# Patient Record
Sex: MALE | Race: WHITE | NOT HISPANIC OR LATINO | URBAN - METROPOLITAN AREA
[De-identification: names, ages, dates, MRNs, and addresses within clinical notes are randomized per-mention and may not be internally consistent; named-entity substitution may affect disease eponyms.]

---

## 2020-11-03 ENCOUNTER — OFFICE VISIT (OUTPATIENT)
Dept: URBAN - METROPOLITAN AREA CLINIC 60 | Facility: CLINIC | Age: 65
End: 2020-11-03

## 2020-11-09 ENCOUNTER — OFFICE VISIT (OUTPATIENT)
Dept: URBAN - METROPOLITAN AREA CLINIC 121 | Facility: CLINIC | Age: 65
End: 2020-11-09

## 2020-11-18 LAB
% SATURATION: (no result)
ABSOLUTE BASOPHILS: (no result)
ABSOLUTE EOSINOPHILS: (no result)
ABSOLUTE LYMPHOCYTES: (no result)
ABSOLUTE MONOCYTES: (no result)
ABSOLUTE NEUTROPHILS: (no result)
ALBUMIN/GLOBULIN RATIO: (no result)
ALBUMIN: (no result)
ALKALINE PHOSPHATASE: (no result)
ALT: (no result)
AST: (no result)
BASOPHILS: (no result)
BILIRUBIN, TOTAL: (no result)
BUN/CREATININE RATIO: (no result)
CALCIUM: (no result)
CARBON DIOXIDE: (no result)
CHLORIDE: (no result)
CREATININE: (no result)
EGFR AFRICAN AMERICA: (no result)
EGFR NON-AFR. AMERICAN: (no result)
EOSINOPHILS: (no result)
FERRITIN: (no result)
FOLATE, SERUM: (no result)
GLOBULIN: (no result)
GLUCOSE: (no result)
HEMATOCRIT: (no result)
HEMOGLOBIN: (no result)
HEPATITIS C ANTIBODY: (no result)
INR: 1
IRON BINDING CAPACITY: (no result)
IRON, TOTAL: (no result)
LYMPHOCYTES: (no result)
MCH: (no result)
MCHC: (no result)
MCV: (no result)
MONOCYTES: (no result)
MPV: (no result)
NEUTROPHILS: (no result)
PLATELET COUNT: (no result)
POTASSIUM: (no result)
PROTEIN, TOTAL: (no result)
PT: (no result)
RDW: (no result)
RED BLOOD CELL COUNT: (no result)
SIGNAL TO CUT-OFF: 0.02
SODIUM: (no result)
UREA NITROGEN (BUN): (no result)
VITAMIN B12: (no result)
WHITE BLOOD CELL COUNT: (no result)

## 2020-11-20 ENCOUNTER — OFFICE VISIT (OUTPATIENT)
Dept: URBAN - METROPOLITAN AREA SURGERY CENTER 4 | Facility: SURGERY CENTER | Age: 65
End: 2020-11-20

## 2020-11-24 ENCOUNTER — OFFICE VISIT (OUTPATIENT)
Dept: URBAN - METROPOLITAN AREA SURGERY CENTER 4 | Facility: SURGERY CENTER | Age: 65
End: 2020-11-24

## 2020-12-04 ENCOUNTER — OFFICE VISIT (OUTPATIENT)
Dept: URBAN - METROPOLITAN AREA CLINIC 63 | Facility: CLINIC | Age: 65
End: 2020-12-04

## 2020-12-15 ENCOUNTER — OFFICE VISIT (OUTPATIENT)
Dept: URBAN - METROPOLITAN AREA SURGERY CENTER 4 | Facility: SURGERY CENTER | Age: 65
End: 2020-12-15

## 2020-12-17 LAB — PATHOLOGY (INDENTED REPORT): (no result)

## 2020-12-29 ENCOUNTER — OFFICE VISIT (OUTPATIENT)
Dept: URBAN - METROPOLITAN AREA CLINIC 63 | Facility: CLINIC | Age: 65
End: 2020-12-29

## 2021-01-05 ENCOUNTER — OFFICE VISIT (OUTPATIENT)
Dept: URBAN - METROPOLITAN AREA SURGERY CENTER 4 | Facility: SURGERY CENTER | Age: 66
End: 2021-01-05

## 2021-01-19 ENCOUNTER — OFFICE VISIT (OUTPATIENT)
Dept: URBAN - METROPOLITAN AREA SURGERY CENTER 4 | Facility: SURGERY CENTER | Age: 66
End: 2021-01-19

## 2021-01-21 LAB — PATHOLOGY (INDENTED REPORT): (no result)

## 2021-02-01 ENCOUNTER — OFFICE VISIT (OUTPATIENT)
Dept: URBAN - METROPOLITAN AREA CLINIC 60 | Facility: CLINIC | Age: 66
End: 2021-02-01

## 2021-02-08 ENCOUNTER — OFFICE VISIT (OUTPATIENT)
Dept: URBAN - METROPOLITAN AREA CLINIC 60 | Facility: CLINIC | Age: 66
End: 2021-02-08

## 2021-02-11 ENCOUNTER — OFFICE VISIT (OUTPATIENT)
Dept: URBAN - METROPOLITAN AREA CLINIC 63 | Facility: CLINIC | Age: 66
End: 2021-02-11

## 2021-02-12 LAB
ABSOLUTE BASOPHILS: (no result)
ABSOLUTE EOSINOPHILS: (no result)
ABSOLUTE LYMPHOCYTES: (no result)
ABSOLUTE MONOCYTES: (no result)
ABSOLUTE NEUTROPHILS: (no result)
ALBUMIN/GLOBULIN RATIO: (no result)
ALBUMIN: (no result)
ALKALINE PHOSPHATASE: (no result)
ALT: (no result)
AST: (no result)
BASOPHILS: (no result)
BILIRUBIN, TOTAL: (no result)
BUN/CREATININE RATIO: (no result)
CALCIUM: (no result)
CARBON DIOXIDE: (no result)
CHLORIDE: (no result)
CREATININE: (no result)
EGFR AFRICAN AMERICA: (no result)
EGFR NON-AFR. AMERICAN: (no result)
EOSINOPHILS: (no result)
GLOBULIN: (no result)
GLUCOSE: (no result)
HEMATOCRIT: (no result)
HEMOGLOBIN: (no result)
LYMPHOCYTES: (no result)
MCH: (no result)
MCHC: (no result)
MCV: (no result)
MONOCYTES: (no result)
MPV: (no result)
NEUTROPHILS: (no result)
PLATELET COUNT: (no result)
POTASSIUM: (no result)
PROTEIN, TOTAL: (no result)
RDW: (no result)
RED BLOOD CELL COUNT: (no result)
SODIUM: (no result)
UREA NITROGEN (BUN): (no result)
WHITE BLOOD CELL COUNT: (no result)

## 2021-03-25 ENCOUNTER — OFFICE VISIT (OUTPATIENT)
Dept: URBAN - METROPOLITAN AREA CLINIC 63 | Facility: CLINIC | Age: 66
End: 2021-03-25

## 2021-03-31 LAB
% SATURATION: (no result)
ABSOLUTE BASOPHILS: (no result)
ABSOLUTE EOSINOPHILS: (no result)
ABSOLUTE LYMPHOCYTES: (no result)
ABSOLUTE MONOCYTES: (no result)
ABSOLUTE NEUTROPHILS: (no result)
ALBUMIN/GLOBULIN RATIO: (no result)
ALBUMIN: (no result)
ALKALINE PHOSPHATASE: (no result)
ALT: (no result)
ANACHOICE(R) SCREEN: NEGATIVE
AST: (no result)
BASOPHILS: (no result)
BILIRUBIN, TOTAL: (no result)
BUN/CREATININE RATIO: (no result)
CALCIUM: (no result)
CARBON DIOXIDE: (no result)
CERULOPLASMIN: (no result)
CHLORIDE: (no result)
CREATININE: (no result)
EGFR AFRICAN AMERICA: (no result)
EGFR NON-AFR. AMERICAN: (no result)
EOSINOPHILS: (no result)
FERRITIN: (no result)
GLOBULIN: (no result)
GLUCOSE: (no result)
HEMATOCRIT: (no result)
HEMOGLOBIN: (no result)
HEPATITIS A AB, TOTAL: (no result)
HEPATITIS A IGM: (no result)
HEPATITIS B CORE AB TOTAL: (no result)
HEPATITIS B CORE ANTIBODY (IGM): (no result)
HEPATITIS B SURFACE ANTIBODY QL: (no result)
HEPATITIS B SURFACE ANTIGEN: (no result)
INR: 1
IRON BINDING CAPACITY: (no result)
IRON, TOTAL: (no result)
LYMPHOCYTES: (no result)
MCH: (no result)
MCHC: (no result)
MCV: (no result)
MITOCHONDRIAL AB SCREEN: NEGATIVE
MONOCYTES: (no result)
MPV: (no result)
NEUTROPHILS: (no result)
PLATELET COUNT: (no result)
POTASSIUM: (no result)
PROTEIN, TOTAL: (no result)
PT: (no result)
RDW: (no result)
RED BLOOD CELL COUNT: (no result)
SMOOTH MUSCLE AB SCREEN: NEGATIVE
SODIUM: (no result)
UREA NITROGEN (BUN): (no result)
WHITE BLOOD CELL COUNT: (no result)

## 2021-04-06 ENCOUNTER — OFFICE VISIT (OUTPATIENT)
Dept: URBAN - METROPOLITAN AREA SURGERY CENTER 4 | Facility: SURGERY CENTER | Age: 66
End: 2021-04-06

## 2021-05-05 ENCOUNTER — OFFICE VISIT (OUTPATIENT)
Dept: URBAN - METROPOLITAN AREA CLINIC 63 | Facility: CLINIC | Age: 66
End: 2021-05-05

## 2021-05-18 ENCOUNTER — OFFICE VISIT (OUTPATIENT)
Dept: URBAN - METROPOLITAN AREA CLINIC 63 | Facility: CLINIC | Age: 66
End: 2021-05-18

## 2021-10-17 ENCOUNTER — OFFICE VISIT (OUTPATIENT)
Dept: URBAN - METROPOLITAN AREA CLINIC 121 | Facility: CLINIC | Age: 66
End: 2021-10-17

## 2022-03-03 ENCOUNTER — OFFICE VISIT (OUTPATIENT)
Dept: URBAN - METROPOLITAN AREA CLINIC 63 | Facility: CLINIC | Age: 67
End: 2022-03-03

## 2022-05-04 ENCOUNTER — OFFICE VISIT (OUTPATIENT)
Dept: URBAN - METROPOLITAN AREA MEDICAL CENTER 14 | Facility: MEDICAL CENTER | Age: 67
End: 2022-05-04

## 2022-05-04 LAB
% SATURATION: (no result)
ALBUMIN/GLOBULIN RATIO: (no result)
ALBUMIN: (no result)
ALKALINE PHOSPHATASE: (no result)
ALT: (no result)
AST: (no result)
BILIRUBIN, TOTAL: (no result)
BUN/CREATININE RATIO: (no result)
CALCIUM: (no result)
CARBON DIOXIDE: (no result)
CHLORIDE: (no result)
CREATININE: (no result)
EGFR AFRICAN AMERIC: (no result)
EGFR NON-AFR. AMERI: (no result)
FERRITIN: (no result)
FOLATE, SERUM: (no result)
GLOBULIN: (no result)
GLUCOSE: (no result)
IRON BINDING CAPACITY: (no result)
IRON, TOTAL: (no result)
POTASSIUM: (no result)
PROTEIN, TOTAL: (no result)
SODIUM: (no result)
UREA NITROGEN (BUN): (no result)
VITAMIN B12: (no result)
WHITE BLOOD CELL COUNT: (no result)

## 2022-05-12 ENCOUNTER — OFFICE VISIT (OUTPATIENT)
Dept: URBAN - METROPOLITAN AREA CLINIC 63 | Facility: CLINIC | Age: 67
End: 2022-05-12

## 2022-05-18 ENCOUNTER — OFFICE VISIT (OUTPATIENT)
Dept: URBAN - METROPOLITAN AREA CLINIC 63 | Facility: CLINIC | Age: 67
End: 2022-05-18

## 2022-05-26 ENCOUNTER — OFFICE VISIT (OUTPATIENT)
Dept: URBAN - METROPOLITAN AREA CLINIC 63 | Facility: CLINIC | Age: 67
End: 2022-05-26

## 2022-06-02 ENCOUNTER — LAB OUTSIDE AN ENCOUNTER (OUTPATIENT)
Dept: URBAN - METROPOLITAN AREA CLINIC 121 | Facility: CLINIC | Age: 67
End: 2022-06-02

## 2022-06-06 LAB
EXTRA TUBE RECEIVED: (no result)
SPECIMEN TYPE RECEIVED: (no result)
WHITE BLOOD CELL COUNT: (no result)

## 2022-06-27 ENCOUNTER — TELEPHONE ENCOUNTER (OUTPATIENT)
Dept: URBAN - METROPOLITAN AREA CLINIC 63 | Facility: CLINIC | Age: 67
End: 2022-06-27

## 2022-06-28 ENCOUNTER — TELEPHONE ENCOUNTER (OUTPATIENT)
Dept: URBAN - METROPOLITAN AREA CLINIC 63 | Facility: CLINIC | Age: 67
End: 2022-06-28

## 2022-07-09 ENCOUNTER — TELEPHONE ENCOUNTER (OUTPATIENT)
Dept: URBAN - METROPOLITAN AREA CLINIC 121 | Facility: CLINIC | Age: 67
End: 2022-07-09

## 2022-07-09 RX ORDER — COLCHICINE 0.6 MG/1
TABLET, FILM COATED ORAL
Refills: 0 | OUTPATIENT
Start: 2020-12-17 | End: 2022-03-03

## 2022-07-09 RX ORDER — NABUMETONE 750 MG/1
TABLET, FILM COATED ORAL
Refills: 0 | OUTPATIENT
Start: 2020-11-03 | End: 2021-02-05

## 2022-07-09 RX ORDER — CARVEDILOL 12.5 MG/1
TABLET, FILM COATED ORAL
Refills: 0 | OUTPATIENT
Start: 2021-01-08 | End: 2022-03-03

## 2022-07-09 RX ORDER — SIMVASTATIN 40 MG/1
TABLET, FILM COATED ORAL
Refills: 0 | OUTPATIENT
Start: 2019-04-29 | End: 2019-08-05

## 2022-07-09 RX ORDER — TRAMADOL HYDROCHLORIDE 50 MG/1
TABLET ORAL
Refills: 0 | OUTPATIENT
Start: 2019-08-05 | End: 2020-11-03

## 2022-07-09 RX ORDER — TRAMADOL HYDROCHLORIDE 50 MG/1
TABLET ORAL
Refills: 0 | OUTPATIENT
Start: 2019-04-29 | End: 2019-08-05

## 2022-07-09 RX ORDER — CARVEDILOL 12.5 MG/1
TABLET, FILM COATED ORAL
Refills: 0 | OUTPATIENT
Start: 2019-04-29 | End: 2019-08-05

## 2022-07-09 RX ORDER — LISINOPRIL 20 MG/1
TABLET ORAL
Refills: 0 | OUTPATIENT
Start: 2021-01-12 | End: 2022-03-03

## 2022-07-09 RX ORDER — OMEGA-3S/DHA/EPA/FISH OIL 980-1400MG
CAPSULE,DELAYED RELEASE (ENTERIC COATED) ORAL
Refills: 0 | OUTPATIENT
Start: 2019-04-29 | End: 2019-08-05

## 2022-07-09 RX ORDER — ISONIAZID 300 MG/1
TABLET ORAL
Refills: 0 | OUTPATIENT
Start: 2020-11-03 | End: 2021-02-05

## 2022-07-09 RX ORDER — CARVEDILOL 12.5 MG/1
TABLET, FILM COATED ORAL
Refills: 0 | OUTPATIENT
Start: 2020-11-03 | End: 2021-02-05

## 2022-07-09 RX ORDER — TRAMADOL HYDROCHLORIDE 50 MG/1
TABLET ORAL
Refills: 0 | OUTPATIENT
Start: 2020-11-03 | End: 2021-02-05

## 2022-07-09 RX ORDER — OMEPRAZOLE 20 MG/1
TABLET, ORALLY DISINTEGRATING, DELAYED RELEASE ORAL
Refills: 0 | OUTPATIENT
Start: 2019-04-29 | End: 2019-08-05

## 2022-07-09 RX ORDER — OMEPRAZOLE 20 MG/1
TABLET, ORALLY DISINTEGRATING, DELAYED RELEASE ORAL
Refills: 0 | OUTPATIENT
Start: 2020-11-03 | End: 2021-02-05

## 2022-07-09 RX ORDER — LISINOPRIL 20 MG/1
TABLET ORAL
Refills: 0 | OUTPATIENT
Start: 2020-11-03 | End: 2021-02-05

## 2022-07-09 RX ORDER — OMEPRAZOLE 20 MG/1
TABLET, ORALLY DISINTEGRATING, DELAYED RELEASE ORAL
Refills: 0 | OUTPATIENT
Start: 2019-08-05 | End: 2020-11-03

## 2022-07-09 RX ORDER — ALLOPURINOL 300 MG/1
TABLET ORAL
Refills: 0 | OUTPATIENT
Start: 2021-01-04 | End: 2022-03-03

## 2022-07-09 RX ORDER — PREGABALIN 150 MG
CAPSULE ORAL
Refills: 0 | OUTPATIENT
Start: 2019-04-29 | End: 2019-08-05

## 2022-07-09 RX ORDER — PREGABALIN 150 MG
CAPSULE ORAL
Refills: 0 | OUTPATIENT
Start: 2020-11-03 | End: 2021-02-05

## 2022-07-09 RX ORDER — COLCHICINE 0.6 MG/1
TABLET ORAL
Refills: 0 | OUTPATIENT
Start: 2019-04-29 | End: 2019-08-05

## 2022-07-09 RX ORDER — NABUMETONE 750 MG/1
TABLET, FILM COATED ORAL
Refills: 0 | OUTPATIENT
Start: 2019-04-29 | End: 2019-08-05

## 2022-07-09 RX ORDER — ALLOPURINOL 300 MG/1
TABLET ORAL
Refills: 0 | OUTPATIENT
Start: 2020-11-03 | End: 2021-02-05

## 2022-07-09 RX ORDER — PREGABALIN 150 MG
CAPSULE ORAL
Refills: 0 | OUTPATIENT
Start: 2019-08-05 | End: 2020-11-03

## 2022-07-09 RX ORDER — FERROUS FUMARATE AND POLYSACCHRIDE IRON VITAMIN MINERAL COMPLEX SUPPLEMENT 191.2; 135.9; 1; 210; 20; 5; 5; 7; 25; 3 MG/1; MG/1; MG/1; MG/1; MG/1; MG/1; MG/1; MG/1; MG/1; UG/1
TAKE ONE CAPSULE BY MOUTH ONCE A DAY CAPSULE ORAL ONCE A DAY
Refills: 1 | OUTPATIENT
Start: 2022-05-18 | End: 2022-06-15

## 2022-07-09 RX ORDER — ONDANSETRON HYDROCHLORIDE 4 MG/2ML
1 EVERY 4 - 6 HOURS AS NEEDED INJECTION, SOLUTION INTRAMUSCULAR; INTRAVENOUS
Refills: 0 | OUTPATIENT
Start: 2019-04-29 | End: 2020-11-03

## 2022-07-09 RX ORDER — GOLIMUMAB 50 MG/.5ML
INJECTION, SOLUTION SUBCUTANEOUS
Refills: 0 | OUTPATIENT
Start: 2019-04-29 | End: 2019-08-05

## 2022-07-09 RX ORDER — ALLOPURINOL 300 MG/1
TABLET ORAL
Refills: 0 | OUTPATIENT
Start: 2019-08-05 | End: 2020-11-03

## 2022-07-09 RX ORDER — ALLOPURINOL 300 MG/1
TABLET ORAL
Refills: 0 | OUTPATIENT
Start: 2019-04-29 | End: 2019-08-05

## 2022-07-09 RX ORDER — SIMVASTATIN 40 MG/1
TABLET, FILM COATED ORAL
Refills: 0 | OUTPATIENT
Start: 2019-08-05 | End: 2020-11-03

## 2022-07-09 RX ORDER — OMEGA-3S/DHA/EPA/FISH OIL 980-1400MG
CAPSULE,DELAYED RELEASE (ENTERIC COATED) ORAL
Refills: 0 | OUTPATIENT
Start: 2019-08-05 | End: 2020-11-03

## 2022-07-09 RX ORDER — SIMVASTATIN 40 MG/1
TABLET, FILM COATED ORAL
Refills: 0 | OUTPATIENT
Start: 2020-12-28 | End: 2022-03-03

## 2022-07-09 RX ORDER — LISINOPRIL 20 MG/1
TABLET ORAL
Refills: 0 | OUTPATIENT
Start: 2019-08-05 | End: 2020-11-03

## 2022-07-09 RX ORDER — SULFASALAZINE 500 MG/1
TABLET ORAL
Refills: 0 | OUTPATIENT
Start: 2022-01-06 | End: 2022-03-03

## 2022-07-09 RX ORDER — LISINOPRIL 20 MG/1
TABLET ORAL
Refills: 0 | OUTPATIENT
Start: 2021-01-21 | End: 2021-02-05

## 2022-07-09 RX ORDER — AMOXICILLIN AND CLAVULANATE POTASSIUM 875; 125 MG/1; MG/1
TABLET ORAL TWICE A DAY
Refills: 0 | OUTPATIENT
Start: 2019-08-05 | End: 2020-11-03

## 2022-07-09 RX ORDER — PANTOPRAZOLE SODIUM 40 MG/1
TABLET, DELAYED RELEASE ORAL
Refills: 0 | OUTPATIENT
Start: 2022-02-23 | End: 2022-03-03

## 2022-07-09 RX ORDER — COLCHICINE 0.6 MG/1
TABLET ORAL
Refills: 0 | OUTPATIENT
Start: 2019-08-05 | End: 2020-11-03

## 2022-07-09 RX ORDER — CARVEDILOL 12.5 MG/1
TABLET, FILM COATED ORAL
Refills: 0 | OUTPATIENT
Start: 2019-08-05 | End: 2020-11-03

## 2022-07-09 RX ORDER — NABUMETONE 750 MG/1
TABLET, FILM COATED ORAL
Refills: 0 | OUTPATIENT
Start: 2019-08-05 | End: 2020-11-03

## 2022-07-09 RX ORDER — SUCRALFATE 1 G/1
TABLET ORAL
Refills: 0 | OUTPATIENT
Start: 2022-02-23 | End: 2022-03-03

## 2022-07-09 RX ORDER — SIMVASTATIN 40 MG/1
TABLET, FILM COATED ORAL
Refills: 0 | OUTPATIENT
Start: 2020-11-03 | End: 2021-02-05

## 2022-07-09 RX ORDER — NABUMETONE 750 MG/1
TABLET, FILM COATED ORAL
Refills: 0 | OUTPATIENT
Start: 2020-12-01 | End: 2022-03-03

## 2022-07-09 RX ORDER — MULTIVIT-MIN/FA/LYCOPEN/LUTEIN .4-300-25
TABLET ORAL
Refills: 0 | OUTPATIENT
Start: 2020-11-03 | End: 2022-03-03

## 2022-07-09 RX ORDER — ISONIAZID 300 MG/1
TABLET ORAL
Refills: 0 | OUTPATIENT
Start: 2020-11-18 | End: 2022-03-03

## 2022-07-09 RX ORDER — GOLIMUMAB 50 MG/.5ML
INJECTION, SOLUTION SUBCUTANEOUS
Refills: 0 | OUTPATIENT
Start: 2019-08-05 | End: 2020-11-03

## 2022-07-09 RX ORDER — CLOPIDOGREL 75 MG/1
TABLET ORAL
Refills: 0 | OUTPATIENT
Start: 2022-02-23 | End: 2022-03-03

## 2022-07-09 RX ORDER — SULFASALAZINE 500 MG/1
TABLET ORAL
Refills: 0 | OUTPATIENT
Start: 2020-11-03 | End: 2021-02-05

## 2022-07-09 RX ORDER — GOLIMUMAB 50 MG/.5ML
INJECTION, SOLUTION SUBCUTANEOUS
Refills: 0 | OUTPATIENT
Start: 2021-01-25 | End: 2022-03-03

## 2022-07-09 RX ORDER — LISINOPRIL 20 MG/1
TABLET ORAL
Refills: 0 | OUTPATIENT
Start: 2019-04-29 | End: 2019-08-05

## 2022-07-09 RX ORDER — PANTOPRAZOLE SODIUM 40 MG/1
TABLET, DELAYED RELEASE ORAL
Refills: 0 | OUTPATIENT
Start: 2020-12-18 | End: 2022-03-03

## 2022-07-10 ENCOUNTER — TELEPHONE ENCOUNTER (OUTPATIENT)
Dept: URBAN - METROPOLITAN AREA CLINIC 121 | Facility: CLINIC | Age: 67
End: 2022-07-10

## 2022-07-10 RX ORDER — SUCRALFATE 1 G/1
TABLET ORAL
Refills: 0 | Status: ACTIVE | COMMUNITY
Start: 2022-03-03

## 2022-07-10 RX ORDER — PANTOPRAZOLE SODIUM 40 MG/1
TABLET, DELAYED RELEASE ORAL TWICE A DAY
Refills: 0 | Status: ACTIVE | COMMUNITY
Start: 2022-03-03

## 2022-07-10 RX ORDER — SUCRALFATE 1 G/1
TABLET ORAL
Refills: 0 | Status: ACTIVE | COMMUNITY
Start: 2022-04-25

## 2022-07-10 RX ORDER — CARVEDILOL 12.5 MG/1
TABLET, FILM COATED ORAL ONCE A DAY
Refills: 0 | Status: ACTIVE | COMMUNITY
Start: 2022-03-03

## 2022-07-10 RX ORDER — ROPINIROLE 0.5 MG/1
TABLET, FILM COATED ORAL
Refills: 0 | Status: ACTIVE | COMMUNITY
Start: 2022-04-22

## 2022-07-10 RX ORDER — CLOPIDOGREL 75 MG/1
TABLET ORAL ONCE A DAY
Refills: 0 | Status: ACTIVE | COMMUNITY
Start: 2022-03-03

## 2022-07-10 RX ORDER — SULFASALAZINE 500 MG/1
TABLET ORAL TWICE A DAY
Refills: 0 | Status: ACTIVE | COMMUNITY
Start: 2022-03-03

## 2022-07-10 RX ORDER — PANTOPRAZOLE SODIUM 40 MG/1
TAKE ONE TABLET PO ONCE DAILY 30 MINS BEFORE BREAKFAST TABLET, DELAYED RELEASE ORAL ONCE A DAY
Refills: 1 | Status: ACTIVE | COMMUNITY
Start: 2022-05-12

## 2022-07-10 RX ORDER — ONDANSETRON HYDROCHLORIDE 4 MG/2ML
USE AS DIRECTED Q4-6 HOURS PRN INJECTION, SOLUTION INTRAMUSCULAR; INTRAVENOUS
Refills: 0 | Status: ACTIVE | COMMUNITY
Start: 2020-11-03

## 2022-07-10 RX ORDER — ALLOPURINOL 300 MG/1
TABLET ORAL ONCE A DAY
Refills: 0 | Status: ACTIVE | COMMUNITY
Start: 2022-03-03

## 2022-07-10 RX ORDER — LISINOPRIL 5 MG/1
TABLET ORAL
Refills: 0 | Status: ACTIVE | COMMUNITY
Start: 2022-04-22

## 2022-07-10 RX ORDER — IRON FUM,PS/FOLIC/BCOMP,C NO.9 125 MG-1MG
TAKE 1 CAPSULE BY MOUTH EVERY DAY CAPSULE ORAL
Refills: 1 | Status: ACTIVE | COMMUNITY
Start: 2022-06-15

## 2022-07-10 RX ORDER — GOLIMUMAB 50 MG/.5ML
ONCE EVERY 30 DAYS INJECTION, SOLUTION SUBCUTANEOUS TAKE AS DIRECTED
Refills: 0 | Status: ACTIVE | COMMUNITY
Start: 2022-03-03

## 2022-07-10 RX ORDER — FAMOTIDINE 40 MG/1
1 EVERY BEDTIME TABLET, FILM COATED ORAL
Refills: 2 | Status: ACTIVE | COMMUNITY
Start: 2021-03-25

## 2022-07-10 RX ORDER — SIMVASTATIN 40 MG/1
TABLET, FILM COATED ORAL ONCE A DAY
Refills: 0 | Status: ACTIVE | COMMUNITY
Start: 2022-03-03

## 2022-07-30 ENCOUNTER — TELEPHONE ENCOUNTER (OUTPATIENT)
Age: 67
End: 2022-07-30

## 2022-07-31 ENCOUNTER — TELEPHONE ENCOUNTER (OUTPATIENT)
Age: 67
End: 2022-07-31

## 2022-11-14 ENCOUNTER — ERX REFILL RESPONSE (OUTPATIENT)
Dept: URBAN - METROPOLITAN AREA CLINIC 63 | Facility: CLINIC | Age: 67
End: 2022-11-14

## 2022-11-14 RX ORDER — PANTOPRAZOLE SODIUM 40 MG/1
TAKE ONE TABLET PO ONCE DAILY 30 MINS BEFORE BREAKFAST TABLET, DELAYED RELEASE ORAL ONCE A DAY
Refills: 1 | OUTPATIENT

## 2022-11-14 RX ORDER — PANTOPRAZOLE 40 MG/1
TAKE 1 TABLET DAILY 30 MINUTES BEFORE BREAKFAST TABLET, DELAYED RELEASE ORAL
Qty: 90 TABLET | Refills: 3 | OUTPATIENT

## 2022-12-08 ENCOUNTER — TELEPHONE ENCOUNTER (OUTPATIENT)
Dept: URBAN - METROPOLITAN AREA CLINIC 63 | Facility: CLINIC | Age: 67
End: 2022-12-08

## 2023-01-04 ENCOUNTER — LAB OUTSIDE AN ENCOUNTER (OUTPATIENT)
Dept: URBAN - METROPOLITAN AREA CLINIC 63 | Facility: CLINIC | Age: 68
End: 2023-01-04

## 2023-01-04 ENCOUNTER — OFFICE VISIT (OUTPATIENT)
Dept: URBAN - METROPOLITAN AREA CLINIC 63 | Facility: CLINIC | Age: 68
End: 2023-01-04
Payer: COMMERCIAL

## 2023-01-04 VITALS
WEIGHT: 203.2 LBS | DIASTOLIC BLOOD PRESSURE: 60 MMHG | TEMPERATURE: 96.8 F | HEART RATE: 67 BPM | HEIGHT: 72 IN | OXYGEN SATURATION: 95 % | BODY MASS INDEX: 27.52 KG/M2 | SYSTOLIC BLOOD PRESSURE: 100 MMHG

## 2023-01-04 DIAGNOSIS — Z86.010 PERSONAL HISTORY OF COLONIC POLYPS: ICD-10-CM

## 2023-01-04 DIAGNOSIS — K76.0 FATTY LIVER: ICD-10-CM

## 2023-01-04 DIAGNOSIS — Z12.11 COLON CANCER SCREENING: ICD-10-CM

## 2023-01-04 DIAGNOSIS — K22.70 BARRETT'S ESOPHAGUS WITHOUT DYSPLASIA: ICD-10-CM

## 2023-01-04 DIAGNOSIS — D50.0 IRON DEFICIENCY ANEMIA DUE TO CHRONIC BLOOD LOSS: ICD-10-CM

## 2023-01-04 PROCEDURE — 99214 OFFICE O/P EST MOD 30 MIN: CPT | Performed by: PHYSICIAN ASSISTANT

## 2023-01-04 RX ORDER — GLUCOSAMINE SULFATE 500 MG
1 CAPSULE WITH A MEAL CAPSULE ORAL THREE TIMES A DAY
Status: ACTIVE | COMMUNITY

## 2023-01-04 RX ORDER — ROPINIROLE 0.5 MG/1
TABLET, FILM COATED ORAL
Refills: 0 | Status: ACTIVE | COMMUNITY
Start: 2022-04-22

## 2023-01-04 RX ORDER — CHLORTHALIDONE 25 MG/1
1 TABLET IN THE MORNING WITH FOOD TABLET ORAL ONCE A DAY
Status: ACTIVE | COMMUNITY

## 2023-01-04 RX ORDER — POLYETHYLENE GLYCOL 3350, SODIUM CHLORIDE, SODIUM BICARBONATE, POTASSIUM CHLORIDE 420; 11.2; 5.72; 1.48 G/4L; G/4L; G/4L; G/4L
AS DIRECTED POWDER, FOR SOLUTION ORAL ONCE
Qty: 4000 | Refills: 0 | OUTPATIENT
Start: 2023-01-04 | End: 2023-01-05

## 2023-01-04 RX ORDER — GOLIMUMAB 50 MG/.5ML
ONCE EVERY 30 DAYS INJECTION, SOLUTION SUBCUTANEOUS TAKE AS DIRECTED
Refills: 0 | Status: ACTIVE | COMMUNITY
Start: 2022-03-03

## 2023-01-04 RX ORDER — SUCRALFATE 1 G/1
TABLET ORAL
Refills: 0 | Status: ACTIVE | COMMUNITY
Start: 2022-03-03

## 2023-01-04 RX ORDER — CLOPIDOGREL 75 MG/1
TABLET ORAL ONCE A DAY
Refills: 0 | Status: ACTIVE | COMMUNITY
Start: 2022-03-03

## 2023-01-04 RX ORDER — MAG HYDROX/ALUMINUM HYD/SIMETH 400-400-40
AS DIRECTED SUSPENSION, ORAL (FINAL DOSE FORM) ORAL
Status: ACTIVE | COMMUNITY

## 2023-01-04 RX ORDER — ONDANSETRON HYDROCHLORIDE 4 MG/1
1 TABLET TABLET, FILM COATED ORAL
Qty: 2 | Refills: 0 | OUTPATIENT
Start: 2023-01-04

## 2023-01-04 RX ORDER — TRAMADOL HYDROCHLORIDE 50 MG/1
1 TABLET AS NEEDED TABLET, FILM COATED ORAL ONCE A DAY
Status: ACTIVE | COMMUNITY

## 2023-01-04 RX ORDER — PANTOPRAZOLE 40 MG/1
TAKE 1 TABLET DAILY 30 MINUTES BEFORE BREAKFAST TABLET, DELAYED RELEASE ORAL
Qty: 90 TABLET | Refills: 3 | Status: ACTIVE | COMMUNITY

## 2023-01-04 RX ORDER — ALLOPURINOL 300 MG/1
TABLET ORAL ONCE A DAY
Refills: 0 | Status: ACTIVE | COMMUNITY
Start: 2022-03-03

## 2023-01-04 RX ORDER — LISINOPRIL 10 MG/1
AS DIRECTED TABLET ORAL
Refills: 0 | Status: ACTIVE | COMMUNITY
Start: 2022-04-22

## 2023-01-04 RX ORDER — NICOTINE 14MG/24HR
AS DIRECTED PATCH, TRANSDERMAL 24 HOURS TRANSDERMAL
Status: ACTIVE | COMMUNITY

## 2023-01-04 RX ORDER — ROSUVASTATIN CALCIUM 20 MG/1
1 TABLET TABLET, FILM COATED ORAL ONCE A DAY
Status: ACTIVE | COMMUNITY

## 2023-01-04 NOTE — HPI-TODAY'S VISIT:
Eriberto is a pleasant 67-year-old male who presents today for a hospital follow-up.  History of JUANCARLOS and ileal ulcers, Gimenez's esophagus and personal history of colon polyps. S/P lap tiffany in May 2021.  Future procedures to be done in a hospital setting. 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      EGD dated 1/19/2021 revealed esophageal mucosal changes classified as Gimenez's stage C3-M4 per [place] criteria.  Gastritis.  Normal third portion of the duodenum. Reactive gastropathy negative for H. pylori.  Lower third esophageal biopsies with evidence of intestinal metaplasia.  No dysplasia.     Colonoscopy dated 12/15/2020 revealed fair preparation of the colon.  Ileum normal.  5 mm tubular adenoma removed from the mid descending colon.  7 mm tubular adenoma biopsied from the distal sigmoid colon.  Nonbleeding internal hemorrhoids.  Repeat colonoscopy in 6 months due to suboptimal prep and biopsy of 7 mm tubular adenoma in the distal sigmoid colon     Liver ultrasound dated 3/25/2022 showed mild hepatomegaly.  Coarsened hepatic echotexture indicative of diffuse hepatocellular disease such as fatty infiltration.  In moderate or severe steatosis there is increased risk and progression for HUTTON and eventual cirrhosis.  Recommended FibroScan    FibroScan dated 3/25/2022 showed S0 and F0    Colonoscopy dated 4/24/2022 showed no obvious source of bleeding seen.  Diminutive sessile polyp in the area of previous tattoo site in the mid descending colon.  Polyp not removed in light of the antiplatelet therapy.  Left-sided diverticulosis coli.  Mild internal hemorrhoids.  Patient in need of outpatient capsule endoscopy.  Repeat colonoscopy in 1 year in light of suboptimal prep in the cecum.  Procedure to be done in hospital.  Patient given IV iron prior to discharge and 1 unit of PRBCs   Interval HPI 1/4/23  Capsule endoscopy dated 5/26/2022 showed active bleeding in the proximal/mid small bowel  Patient underwent anterograde single balloon enteroscopy with Dr. Montano on 6/20/2022.  Unremarkable small bowel.  Findings of erosive gastropathy.  He presented to the ER on 6/27/2022 with melena.  Evaluated by [facility].  Received over 9 units of PRBC.  Treated with IV iron.  Bleeding scan was positive.  Underwent attempted anterograde single balloon enteroscopy on 6/28/2022 but procedure was aborted because of malfunction of the enteroscope.  Superficial erosions seen in the examined proximal small bowel.  Patient treated with IV Protonix twice daily and subcutaneous octreotide.  Patient underwent successful cautery and hemostasis of actively bleeding mid jejunal vessel via surgically hand-assisted push enteroscopy with APC and Hemoclip placement.  Consideration of focal jejunal resection if recurrent bleed.  Patient is status post PCI and drug-eluting stents to the RCA and LAD in 2/2022.  Postop complicated by ileus.  Restarted on Plavix and aspirin by cardiology  Labs dated 7/6/2022 showed RBC 2.76, hemoglobin 8.1.  Platelets normal.  PT 15.6, INR 1.34.  Normal renal function.  Normal LFTs.  CTA abdomen/pelvis dated 6/27/2022 showed no evidence of active arterial GI hemorrhage.  Descending and sigmoid colon diverticulosis.  Extensive mixed calcified and noncalcified atherosclerotic plaque of the infrarenal aorta.  There are either chronic short segment dissection flap within the common iliac arteries versus areas of mural thrombus and recanalization with weblike chronic plaque.  1 cm complex cyst versus mass at the midpole left kidney.  Follow-up renal protocol MRI is recommended for further evaluation   Attributes weight loss to multiple hospitalizations but overall stable since last visit. Notes 1-2 bowel movements daily without constipation or diarrhea. Currently taking Pantoprazole 40 mg qd and sucralfate QID. Denies nausea, vomiting, heartburn, reflux, dysphagia, odynophagia, hematemesis, coffee ground emesis, abdominal pain, change in bowel habits, BRBPR or melena. Denies any family history of colon cancer or colon polyps. Notes no other GI complaints at this time. Apparently had blood work done with nephrology that showed a normal hemoglobin

## 2023-01-11 ENCOUNTER — LAB OUTSIDE AN ENCOUNTER (OUTPATIENT)
Dept: URBAN - METROPOLITAN AREA CLINIC 63 | Facility: CLINIC | Age: 68
End: 2023-01-11

## 2023-01-28 LAB
A/G RATIO: 1.9
ALBUMIN: 4.7
ALKALINE PHOSPHATASE: 118
ALT (SGPT): 16
AST (SGOT): 15
BILIRUBIN, TOTAL: 0.6
BUN/CREATININE RATIO: 14
BUN: 32
CALCIUM: 10.1
CARBON DIOXIDE, TOTAL: 31
CHLORIDE: 103
CREATININE: 2.22
EGFR: 31
FERRITIN, SERUM: 161
GLOBULIN, TOTAL: 2.5
GLUCOSE: 98
HEMATOCRIT: 36.6
HEMOGLOBIN: 12.6
IRON BIND.CAP.(TIBC): 275
IRON SATURATION: 31
IRON: 86
MCH: 29.2
MCHC: 34.4
MCV: 84.9
MPV: 9.8
PLATELET COUNT: 279
POTASSIUM: 5.2
PROTEIN, TOTAL: 7.2
RDW: 14.1
RED BLOOD CELL COUNT: 4.31
SODIUM: 139
VITAMIN B12: 1558
WHITE BLOOD CELL COUNT: 9.4

## 2023-02-22 ENCOUNTER — TELEPHONE ENCOUNTER (OUTPATIENT)
Dept: URBAN - METROPOLITAN AREA CLINIC 63 | Facility: CLINIC | Age: 68
End: 2023-02-22

## 2023-02-22 ENCOUNTER — LAB OUTSIDE AN ENCOUNTER (OUTPATIENT)
Dept: URBAN - METROPOLITAN AREA CLINIC 63 | Facility: CLINIC | Age: 68
End: 2023-02-22

## 2023-02-23 ENCOUNTER — TELEPHONE ENCOUNTER (OUTPATIENT)
Dept: URBAN - METROPOLITAN AREA CLINIC 63 | Facility: CLINIC | Age: 68
End: 2023-02-23

## 2023-02-28 ENCOUNTER — OFFICE VISIT (OUTPATIENT)
Dept: URBAN - METROPOLITAN AREA CLINIC 63 | Facility: CLINIC | Age: 68
End: 2023-02-28
Payer: COMMERCIAL

## 2023-02-28 ENCOUNTER — WEB ENCOUNTER (OUTPATIENT)
Dept: URBAN - METROPOLITAN AREA CLINIC 63 | Facility: CLINIC | Age: 68
End: 2023-02-28

## 2023-02-28 ENCOUNTER — LAB OUTSIDE AN ENCOUNTER (OUTPATIENT)
Dept: URBAN - METROPOLITAN AREA CLINIC 63 | Facility: CLINIC | Age: 68
End: 2023-02-28

## 2023-02-28 VITALS
WEIGHT: 200 LBS | SYSTOLIC BLOOD PRESSURE: 106 MMHG | HEART RATE: 68 BPM | BODY MASS INDEX: 27.09 KG/M2 | OXYGEN SATURATION: 98 % | DIASTOLIC BLOOD PRESSURE: 60 MMHG | TEMPERATURE: 98.6 F | HEIGHT: 72 IN

## 2023-02-28 DIAGNOSIS — Z86.010 PERSONAL HISTORY OF COLONIC POLYPS: ICD-10-CM

## 2023-02-28 DIAGNOSIS — K76.0 FATTY LIVER: ICD-10-CM

## 2023-02-28 DIAGNOSIS — K22.70 BARRETT'S ESOPHAGUS WITHOUT DYSPLASIA: ICD-10-CM

## 2023-02-28 DIAGNOSIS — Z12.11 COLON CANCER SCREENING: ICD-10-CM

## 2023-02-28 DIAGNOSIS — D50.0 IRON DEFICIENCY ANEMIA DUE TO CHRONIC BLOOD LOSS: ICD-10-CM

## 2023-02-28 PROBLEM — 724556004: Status: ACTIVE | Noted: 2023-01-02

## 2023-02-28 PROBLEM — 428283002: Status: ACTIVE | Noted: 2023-01-02

## 2023-02-28 PROBLEM — 302914006: Status: ACTIVE | Noted: 2023-01-02

## 2023-02-28 PROBLEM — 197321007: Status: ACTIVE | Noted: 2023-01-04

## 2023-02-28 PROCEDURE — 99214 OFFICE O/P EST MOD 30 MIN: CPT | Performed by: PHYSICIAN ASSISTANT

## 2023-02-28 RX ORDER — CHLORTHALIDONE 25 MG/1
1 TABLET IN THE MORNING WITH FOOD TABLET ORAL ONCE A DAY
Status: ACTIVE | COMMUNITY

## 2023-02-28 RX ORDER — SUCRALFATE 1 G/1
TABLET ORAL
Refills: 0 | Status: ACTIVE | COMMUNITY
Start: 2022-03-03

## 2023-02-28 RX ORDER — LISINOPRIL 10 MG/1
AS DIRECTED TABLET ORAL
Refills: 0 | Status: ACTIVE | COMMUNITY
Start: 2022-04-22

## 2023-02-28 RX ORDER — ROSUVASTATIN CALCIUM 20 MG/1
1 TABLET TABLET, FILM COATED ORAL ONCE A DAY
Status: ACTIVE | COMMUNITY

## 2023-02-28 RX ORDER — ROPINIROLE 0.5 MG/1
TABLET, FILM COATED ORAL
Refills: 0 | Status: ACTIVE | COMMUNITY
Start: 2022-04-22

## 2023-02-28 RX ORDER — GOLIMUMAB 50 MG/.5ML
ONCE EVERY 30 DAYS INJECTION, SOLUTION SUBCUTANEOUS TAKE AS DIRECTED
Refills: 0 | Status: ACTIVE | COMMUNITY
Start: 2022-03-03

## 2023-02-28 RX ORDER — CLOPIDOGREL 75 MG/1
TABLET ORAL ONCE A DAY
Refills: 0 | Status: ACTIVE | COMMUNITY
Start: 2022-03-03

## 2023-02-28 RX ORDER — PANTOPRAZOLE 40 MG/1
TAKE 1 TABLET DAILY 30 MINUTES BEFORE BREAKFAST TABLET, DELAYED RELEASE ORAL
Qty: 90 TABLET | Refills: 3 | Status: ACTIVE | COMMUNITY

## 2023-02-28 RX ORDER — TRAMADOL HYDROCHLORIDE 50 MG/1
1 TABLET AS NEEDED TABLET, FILM COATED ORAL ONCE A DAY
Status: ACTIVE | COMMUNITY

## 2023-02-28 RX ORDER — ALLOPURINOL 300 MG/1
TABLET ORAL ONCE A DAY
Refills: 0 | Status: ACTIVE | COMMUNITY
Start: 2022-03-03

## 2023-02-28 RX ORDER — GLUCOSAMINE SULFATE 500 MG
1 CAPSULE WITH A MEAL CAPSULE ORAL THREE TIMES A DAY
Status: ACTIVE | COMMUNITY

## 2023-02-28 RX ORDER — NICOTINE 14MG/24HR
AS DIRECTED PATCH, TRANSDERMAL 24 HOURS TRANSDERMAL
Status: ACTIVE | COMMUNITY

## 2023-02-28 NOTE — HPI-TODAY'S VISIT:
Eriberto is a pleasant 67-year-old male who presents today for a follow-up.  History of JUANCARLOS and ileal ulcers, Gimenez's esophagus and personal history of colon polyps. S/P lap tiffany in May 2021.  Future procedures to be done in a hospital setting. 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    EGD dated 1/19/2021 revealed esophageal mucosal changes classified as Gimenez's stage C3-M4 per [place] criteria.  Gastritis.  Normal third portion of the duodenum. Reactive gastropathy negative for H. pylori.  Lower third esophageal biopsies with evidence of intestinal metaplasia.  No dysplasia.     Colonoscopy dated 12/15/2020 revealed fair preparation of the colon.  Ileum normal.  5 mm tubular adenoma removed from the mid descending colon.  7 mm tubular adenoma biopsied from the distal sigmoid colon.  Nonbleeding internal hemorrhoids.  Repeat colonoscopy in 6 months due to suboptimal prep and biopsy of 7 mm tubular adenoma in the distal sigmoid colon     FibroScan dated 3/25/2022 showed S0 and F0    Colonoscopy dated 4/24/2022 showed no obvious source of bleeding seen.  Diminutive sessile polyp in the area of previous tattoo site in the mid descending colon.  Polyp not removed in light of the antiplatelet therapy.  Left-sided diverticulosis coli.  Mild internal hemorrhoids.  Patient in need of outpatient capsule endoscopy.  Repeat colonoscopy in 1 year in light of suboptimal prep in the cecum.  Procedure to be done in hospital.  Patient given IV iron prior to discharge and 1 unit of PRBCs   Interval HPI 2/28/23  Labs dated 1/28/2023 showed normal iron studies.  Vitamin B12 unremarkable.  Ferritin normal.  Creatinine 2.22, GFR 31.  Normal LFTs.  Hemoglobin 12.6.  Hematocrit 36.6.  Platelets normal.  Labs dated 1/30/2023 showed hemoglobin 12.6, hematocrit 30.5.  Platelets normal.  Creatinine 1.98, GFR 35.9.  Normal LFTs.  FibroScan dated 2/18/2023 showed limited exam secondary to body habitus and suboptimal patient cooperation.  Exam not valid due to IQR/median ratio 47%.  MRI elastography recommended  Ultrasound dated 2/18/2023 showed pancreas obscured by overlying bowel gas.  Otherwise right upper quadrant abdominal ultrasound within normal limits  Capsule endoscopy dated 5/26/2022 showed active bleeding in the proximal/mid small bowel  Patient underwent anterograde single balloon enteroscopy with Dr. Montano on 6/20/2022.  Unremarkable small bowel.  Findings of erosive gastropathy.  Received over 9 units of PRBC on his last admission.  Treated with IV iron.  Bleeding scan was positive.  Underwent attempted anterograde single balloon enteroscopy on 6/28/2022 but procedure was aborted because of malfunction of the enteroscope.  Superficial erosions seen in the examined proximal small bowel.  Patient treated with IV Protonix twice daily and subcutaneous octreotide.  Patient underwent successful cautery and hemostasis of actively bleeding mid jejunal vessel via surgically hand-assisted push enteroscopy with APC and Hemoclip placement.  Consideration of focal jejunal resection if recurrent bleed.  Patient is status post PCI and drug-eluting stents to the RCA and LAD in 2/2022.  Postop complicated by ileus.  Restarted on Plavix and aspirin by cardiology  CTA abdomen/pelvis dated 6/27/2022 showed no evidence of active arterial GI hemorrhage.  Descending and sigmoid colon diverticulosis.  Extensive mixed calcified and noncalcified atherosclerotic plaque of the infrarenal aorta.  There are either chronic short segment dissection flap within the common iliac arteries versus areas of mural thrombus and recanalization with weblike chronic plaque.  1 cm complex cyst versus mass at the midpole left kidney.  Follow-up renal protocol MRI is recommended for further evaluation   Heart cath scheduled for this Monday. Attributes weight loss to multiple hospitalizations but overall stable since last visit. Notes 1-2 bowel movements daily without constipation or diarrhea. Currently taking Pantoprazole 40 mg qd. Denies nausea, vomiting, heartburn, reflux, dysphagia, odynophagia, hematemesis, coffee ground emesis, abdominal pain, change in bowel habits, BRBPR or melena. Denies any family history of colon cancer or colon polyps. Notes no other GI complaints at this time.

## 2023-03-14 ENCOUNTER — LAB OUTSIDE AN ENCOUNTER (OUTPATIENT)
Dept: URBAN - METROPOLITAN AREA CLINIC 64 | Facility: CLINIC | Age: 68
End: 2023-03-14

## 2023-03-14 ENCOUNTER — TELEPHONE ENCOUNTER (OUTPATIENT)
Dept: URBAN - METROPOLITAN AREA CLINIC 64 | Facility: CLINIC | Age: 68
End: 2023-03-14

## 2023-03-22 ENCOUNTER — WEB ENCOUNTER (OUTPATIENT)
Dept: URBAN - METROPOLITAN AREA CLINIC 63 | Facility: CLINIC | Age: 68
End: 2023-03-22

## 2023-03-24 ENCOUNTER — WEB ENCOUNTER (OUTPATIENT)
Dept: URBAN - METROPOLITAN AREA CLINIC 63 | Facility: CLINIC | Age: 68
End: 2023-03-24

## 2023-03-24 ENCOUNTER — TELEPHONE ENCOUNTER (OUTPATIENT)
Dept: URBAN - METROPOLITAN AREA CLINIC 63 | Facility: CLINIC | Age: 68
End: 2023-03-24

## 2023-04-04 ENCOUNTER — TELEPHONE ENCOUNTER (OUTPATIENT)
Dept: URBAN - METROPOLITAN AREA CLINIC 63 | Facility: CLINIC | Age: 68
End: 2023-04-04

## 2023-04-05 ENCOUNTER — OFFICE VISIT (OUTPATIENT)
Dept: URBAN - METROPOLITAN AREA MEDICAL CENTER 14 | Facility: MEDICAL CENTER | Age: 68
End: 2023-04-05
Payer: COMMERCIAL

## 2023-04-05 DIAGNOSIS — D12.3 POLYP OF TRANSVERSE COLON: ICD-10-CM

## 2023-04-05 DIAGNOSIS — Z86.010 PERSONAL HISTORY OF COLONIC POLYPS: ICD-10-CM

## 2023-04-05 DIAGNOSIS — D12.5 SIGMOID POLYP: ICD-10-CM

## 2023-04-05 DIAGNOSIS — D12.4 POLYP OF DESCENDING COLON: ICD-10-CM

## 2023-04-05 PROCEDURE — 45380 COLONOSCOPY AND BIOPSY: CPT | Performed by: INTERNAL MEDICINE

## 2023-04-05 RX ORDER — GOLIMUMAB 50 MG/.5ML
ONCE EVERY 30 DAYS INJECTION, SOLUTION SUBCUTANEOUS TAKE AS DIRECTED
Refills: 0 | Status: ACTIVE | COMMUNITY
Start: 2022-03-03

## 2023-04-05 RX ORDER — SUCRALFATE 1 G/1
TABLET ORAL
Refills: 0 | Status: ACTIVE | COMMUNITY
Start: 2022-03-03

## 2023-04-05 RX ORDER — ALLOPURINOL 300 MG/1
TABLET ORAL ONCE A DAY
Refills: 0 | Status: ACTIVE | COMMUNITY
Start: 2022-03-03

## 2023-04-05 RX ORDER — ROSUVASTATIN CALCIUM 20 MG/1
1 TABLET TABLET, FILM COATED ORAL ONCE A DAY
Status: ACTIVE | COMMUNITY

## 2023-04-05 RX ORDER — LISINOPRIL 10 MG/1
AS DIRECTED TABLET ORAL
Refills: 0 | Status: ACTIVE | COMMUNITY
Start: 2022-04-22

## 2023-04-05 RX ORDER — TRAMADOL HYDROCHLORIDE 50 MG/1
1 TABLET AS NEEDED TABLET, FILM COATED ORAL ONCE A DAY
Status: ACTIVE | COMMUNITY

## 2023-04-05 RX ORDER — ROPINIROLE 0.5 MG/1
TABLET, FILM COATED ORAL
Refills: 0 | Status: ACTIVE | COMMUNITY
Start: 2022-04-22

## 2023-04-05 RX ORDER — CHLORTHALIDONE 25 MG/1
1 TABLET IN THE MORNING WITH FOOD TABLET ORAL ONCE A DAY
Status: ACTIVE | COMMUNITY

## 2023-04-05 RX ORDER — NICOTINE 14MG/24HR
AS DIRECTED PATCH, TRANSDERMAL 24 HOURS TRANSDERMAL
Status: ACTIVE | COMMUNITY

## 2023-04-05 RX ORDER — PANTOPRAZOLE 40 MG/1
TAKE 1 TABLET DAILY 30 MINUTES BEFORE BREAKFAST TABLET, DELAYED RELEASE ORAL
Qty: 90 TABLET | Refills: 3 | Status: ACTIVE | COMMUNITY

## 2023-04-05 RX ORDER — CLOPIDOGREL 75 MG/1
TABLET ORAL ONCE A DAY
Refills: 0 | Status: ACTIVE | COMMUNITY
Start: 2022-03-03

## 2023-04-05 RX ORDER — GLUCOSAMINE SULFATE 500 MG
1 CAPSULE WITH A MEAL CAPSULE ORAL THREE TIMES A DAY
Status: ACTIVE | COMMUNITY

## 2023-04-20 ENCOUNTER — OFFICE VISIT (OUTPATIENT)
Dept: URBAN - METROPOLITAN AREA CLINIC 63 | Facility: CLINIC | Age: 68
End: 2023-04-20

## 2023-04-25 ENCOUNTER — OFFICE VISIT (OUTPATIENT)
Dept: URBAN - METROPOLITAN AREA CLINIC 63 | Facility: CLINIC | Age: 68
End: 2023-04-25
Payer: COMMERCIAL

## 2023-04-25 VITALS
HEIGHT: 72 IN | WEIGHT: 205.2 LBS | OXYGEN SATURATION: 97 % | DIASTOLIC BLOOD PRESSURE: 74 MMHG | HEART RATE: 61 BPM | SYSTOLIC BLOOD PRESSURE: 128 MMHG | BODY MASS INDEX: 27.79 KG/M2 | TEMPERATURE: 96.4 F

## 2023-04-25 DIAGNOSIS — K22.70 BARRETT'S ESOPHAGUS WITHOUT DYSPLASIA: ICD-10-CM

## 2023-04-25 DIAGNOSIS — D50.0 IRON DEFICIENCY ANEMIA DUE TO CHRONIC BLOOD LOSS: ICD-10-CM

## 2023-04-25 DIAGNOSIS — Z12.11 COLON CANCER SCREENING: ICD-10-CM

## 2023-04-25 DIAGNOSIS — Z86.010 PERSONAL HISTORY OF COLONIC POLYPS: ICD-10-CM

## 2023-04-25 DIAGNOSIS — K76.0 FATTY LIVER: ICD-10-CM

## 2023-04-25 PROCEDURE — 99213 OFFICE O/P EST LOW 20 MIN: CPT | Performed by: PHYSICIAN ASSISTANT

## 2023-04-25 RX ORDER — PANTOPRAZOLE 40 MG/1
TAKE 1 TABLET DAILY 30 MINUTES BEFORE BREAKFAST TABLET, DELAYED RELEASE ORAL
Qty: 90 TABLET | Refills: 3 | Status: ACTIVE | COMMUNITY

## 2023-04-25 RX ORDER — GLUCOSAMINE SULFATE 500 MG
1 CAPSULE WITH A MEAL CAPSULE ORAL THREE TIMES A DAY
Status: ACTIVE | COMMUNITY

## 2023-04-25 RX ORDER — NICOTINE 14MG/24HR
AS DIRECTED PATCH, TRANSDERMAL 24 HOURS TRANSDERMAL
Status: ACTIVE | COMMUNITY

## 2023-04-25 RX ORDER — GOLIMUMAB 50 MG/.5ML
ONCE EVERY 30 DAYS INJECTION, SOLUTION SUBCUTANEOUS TAKE AS DIRECTED
Refills: 0 | Status: ACTIVE | COMMUNITY
Start: 2022-03-03

## 2023-04-25 RX ORDER — CHLORTHALIDONE 25 MG/1
1 TABLET IN THE MORNING WITH FOOD TABLET ORAL ONCE A DAY
Status: ACTIVE | COMMUNITY

## 2023-04-25 RX ORDER — ROSUVASTATIN CALCIUM 20 MG/1
1 TABLET TABLET, FILM COATED ORAL ONCE A DAY
Status: ACTIVE | COMMUNITY

## 2023-04-25 RX ORDER — ALLOPURINOL 300 MG/1
TABLET ORAL ONCE A DAY
Refills: 0 | Status: ACTIVE | COMMUNITY
Start: 2022-03-03

## 2023-04-25 RX ORDER — TRAMADOL HYDROCHLORIDE 50 MG/1
1 TABLET AS NEEDED TABLET, FILM COATED ORAL ONCE A DAY
Status: ACTIVE | COMMUNITY

## 2023-04-25 RX ORDER — LISINOPRIL 10 MG/1
AS DIRECTED TABLET ORAL
Refills: 0 | Status: ACTIVE | COMMUNITY
Start: 2022-04-22

## 2023-04-25 RX ORDER — ROPINIROLE 0.5 MG/1
TABLET, FILM COATED ORAL
Refills: 0 | Status: ACTIVE | COMMUNITY
Start: 2022-04-22

## 2023-04-25 RX ORDER — SUCRALFATE 1 G/1
TABLET ORAL
Refills: 0 | Status: ACTIVE | COMMUNITY
Start: 2022-03-03

## 2023-04-25 RX ORDER — CLOPIDOGREL 75 MG/1
TABLET ORAL ONCE A DAY
Refills: 0 | Status: ACTIVE | COMMUNITY
Start: 2022-03-03

## 2023-04-25 NOTE — HPI-TODAY'S VISIT:
Eriberto is a pleasant 68-year-old male who presents today for a procedure follow-up.  History of JUANCARLOS and ileal ulcers, Gimenez's esophagus and personal history of colon polyps. S/P lap tiffany in May 2021.  Future procedures to be done in a hospital setting. 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  Patient underwent colonoscopy on 4/5/2023.  4 diminutive polyps.  Diminutive polyp removed from the mid ascending colon.  Diminutive polyp removed from the hepatic flexure.  Diminutive polyp removed from the mid descending colon in the area of previous tattoo site.  Diminutive polyp removed from the distal sigmoid colon.  Moderate left-sided diverticulosis coli.  Mild internal hemorrhoids present upon retroflexion.  Adequate prep.  All polyps consistent with tubular adenomas but no high-grade dysplasia seen.    EGD dated 1/19/2021 revealed esophageal mucosal changes classified as Gimenez's stage C3-M4 per [place] criteria.  Gastritis.  Normal third portion of the duodenum. Reactive gastropathy negative for H. pylori.  Lower third esophageal biopsies with evidence of intestinal metaplasia.  No dysplasia.      FibroScan dated 3/25/2022 showed S0 and F0   Labs dated 1/28/2023 showed normal iron studies.  Vitamin B12 unremarkable.  Ferritin normal.  Creatinine 2.22, GFR 31.  Normal LFTs.  Hemoglobin 12.6.  Hematocrit 36.6.  Platelets normal.  Labs dated 1/30/2023 showed hemoglobin 12.6, hematocrit 30.5.  Platelets normal.  Creatinine 1.98, GFR 35.9.  Normal LFTs.  FibroScan dated 2/18/2023 showed limited exam secondary to body habitus and suboptimal patient cooperation.  Exam not valid due to IQR/median ratio 47%.  MRI elastography recommended  Ultrasound dated 2/18/2023 showed pancreas obscured by overlying bowel gas.  Otherwise right upper quadrant abdominal ultrasound within normal limits  Capsule endoscopy dated 5/26/2022 showed active bleeding in the proximal/mid small bowel  Patient underwent anterograde single balloon enteroscopy with Dr. Montano on 6/20/2022.  Unremarkable small bowel.  Findings of erosive gastropathy.  Received over 9 units of PRBC on his last admission.  Treated with IV iron.  Bleeding scan was positive.  Underwent attempted anterograde single balloon enteroscopy on 6/28/2022 but procedure was aborted because of malfunction of the enteroscope.  Superficial erosions seen in the examined proximal small bowel.  Patient treated with IV Protonix twice daily and subcutaneous octreotide.  Patient underwent successful cautery and hemostasis of actively bleeding mid jejunal vessel via surgically hand-assisted push enteroscopy with APC and Hemoclip placement.  Consideration of focal jejunal resection if recurrent bleed.  Patient is status post PCI and drug-eluting stents to the RCA and LAD in 2/2022.  Postop complicated by ileus.  Restarted on Plavix and aspirin by cardiology  CTA abdomen/pelvis dated 6/27/2022 showed no evidence of active arterial GI hemorrhage.  Descending and sigmoid colon diverticulosis.  Extensive mixed calcified and noncalcified atherosclerotic plaque of the infrarenal aorta.  There are either chronic short segment dissection flap within the common iliac arteries versus areas of mural thrombus and recanalization with weblike chronic plaque.  1 cm complex cyst versus mass at the midpole left kidney.  Follow-up renal protocol MRI is recommended for further evaluation   Heart cath scheduled for this week. Weight stable. Gained 5 pounds. Notes 1-2 bowel movements daily without constipation or diarrhea. Currently taking Pantoprazole 40 mg qd. Minimal breakthrough symptoms relieved with Maalox as needed. Denies nausea, vomiting, dysphagia, odynophagia, hematemesis, coffee ground emesis, abdominal pain, change in bowel habits, BRBPR or melena. Denies any family history of colon cancer or colon polyps. Notes no other GI complaints at this time.

## 2023-07-24 ENCOUNTER — OFFICE VISIT (OUTPATIENT)
Dept: URBAN - METROPOLITAN AREA CLINIC 60 | Facility: CLINIC | Age: 68
End: 2023-07-24

## 2023-07-24 RX ORDER — CHLORTHALIDONE 25 MG/1
1 TABLET IN THE MORNING WITH FOOD TABLET ORAL ONCE A DAY
COMMUNITY

## 2023-07-24 RX ORDER — GLUCOSAMINE SULFATE 500 MG
1 CAPSULE WITH A MEAL CAPSULE ORAL THREE TIMES A DAY
COMMUNITY

## 2023-07-24 RX ORDER — ALLOPURINOL 300 MG/1
TABLET ORAL ONCE A DAY
Refills: 0 | COMMUNITY
Start: 2022-03-03

## 2023-07-24 RX ORDER — CLOPIDOGREL 75 MG/1
TABLET ORAL ONCE A DAY
Refills: 0 | COMMUNITY
Start: 2022-03-03

## 2023-07-24 RX ORDER — ROSUVASTATIN CALCIUM 20 MG/1
1 TABLET TABLET, FILM COATED ORAL ONCE A DAY
COMMUNITY

## 2023-07-24 RX ORDER — LISINOPRIL 10 MG/1
AS DIRECTED TABLET ORAL
Refills: 0 | COMMUNITY
Start: 2022-04-22

## 2023-07-24 RX ORDER — SUCRALFATE 1 G/1
TABLET ORAL
Refills: 0 | COMMUNITY
Start: 2022-03-03

## 2023-07-24 RX ORDER — ROPINIROLE 0.5 MG/1
TABLET, FILM COATED ORAL
Refills: 0 | COMMUNITY
Start: 2022-04-22

## 2023-07-24 RX ORDER — GOLIMUMAB 50 MG/.5ML
ONCE EVERY 30 DAYS INJECTION, SOLUTION SUBCUTANEOUS TAKE AS DIRECTED
Refills: 0 | COMMUNITY
Start: 2022-03-03

## 2023-07-24 RX ORDER — TRAMADOL HYDROCHLORIDE 50 MG/1
1 TABLET AS NEEDED TABLET, FILM COATED ORAL ONCE A DAY
COMMUNITY

## 2023-07-24 RX ORDER — PANTOPRAZOLE 40 MG/1
TAKE 1 TABLET DAILY 30 MINUTES BEFORE BREAKFAST TABLET, DELAYED RELEASE ORAL
Qty: 90 TABLET | Refills: 3 | COMMUNITY

## 2023-07-24 RX ORDER — NICOTINE 14MG/24HR
AS DIRECTED PATCH, TRANSDERMAL 24 HOURS TRANSDERMAL
COMMUNITY

## 2023-08-02 ENCOUNTER — TELEPHONE ENCOUNTER (OUTPATIENT)
Dept: URBAN - METROPOLITAN AREA CLINIC 63 | Facility: CLINIC | Age: 68
End: 2023-08-02

## 2023-08-18 ENCOUNTER — LAB OUTSIDE AN ENCOUNTER (OUTPATIENT)
Dept: URBAN - METROPOLITAN AREA CLINIC 63 | Facility: CLINIC | Age: 68
End: 2023-08-18

## 2023-08-18 ENCOUNTER — DASHBOARD ENCOUNTERS (OUTPATIENT)
Age: 68
End: 2023-08-18

## 2023-08-18 ENCOUNTER — OFFICE VISIT (OUTPATIENT)
Dept: URBAN - METROPOLITAN AREA CLINIC 63 | Facility: CLINIC | Age: 68
End: 2023-08-18
Payer: COMMERCIAL

## 2023-08-18 VITALS
OXYGEN SATURATION: 96 % | HEIGHT: 72 IN | DIASTOLIC BLOOD PRESSURE: 76 MMHG | BODY MASS INDEX: 28.04 KG/M2 | WEIGHT: 207 LBS | SYSTOLIC BLOOD PRESSURE: 150 MMHG | HEART RATE: 66 BPM | TEMPERATURE: 96.5 F

## 2023-08-18 DIAGNOSIS — Z86.010 PERSONAL HISTORY OF COLONIC POLYPS: ICD-10-CM

## 2023-08-18 DIAGNOSIS — K22.70 BARRETT'S ESOPHAGUS WITHOUT DYSPLASIA: ICD-10-CM

## 2023-08-18 PROCEDURE — 99213 OFFICE O/P EST LOW 20 MIN: CPT | Performed by: PHYSICIAN ASSISTANT

## 2023-08-18 RX ORDER — CHLORTHALIDONE 25 MG/1
1 TABLET IN THE MORNING WITH FOOD TABLET ORAL ONCE A DAY
Status: ACTIVE | COMMUNITY

## 2023-08-18 RX ORDER — ROPINIROLE 0.5 MG/1
TABLET, FILM COATED ORAL
Refills: 0 | Status: ACTIVE | COMMUNITY
Start: 2022-04-22

## 2023-08-18 RX ORDER — GOLIMUMAB 50 MG/.5ML
ONCE EVERY 30 DAYS INJECTION, SOLUTION SUBCUTANEOUS TAKE AS DIRECTED
Refills: 0 | Status: ACTIVE | COMMUNITY
Start: 2022-03-03

## 2023-08-18 RX ORDER — PANTOPRAZOLE 40 MG/1
TAKE 1 TABLET DAILY 30 MINUTES BEFORE BREAKFAST TABLET, DELAYED RELEASE ORAL
Qty: 90 TABLET | Refills: 3 | Status: ACTIVE | COMMUNITY

## 2023-08-18 RX ORDER — ALLOPURINOL 300 MG/1
TABLET ORAL ONCE A DAY
Refills: 0 | Status: ACTIVE | COMMUNITY
Start: 2022-03-03

## 2023-08-18 RX ORDER — CLOPIDOGREL 75 MG/1
TABLET ORAL ONCE A DAY
Refills: 0 | Status: ACTIVE | COMMUNITY
Start: 2022-03-03

## 2023-08-18 RX ORDER — ROSUVASTATIN CALCIUM 20 MG/1
1 TABLET TABLET, FILM COATED ORAL ONCE A DAY
Status: ACTIVE | COMMUNITY

## 2023-08-18 RX ORDER — TRAMADOL HYDROCHLORIDE 50 MG/1
1 TABLET AS NEEDED TABLET, FILM COATED ORAL ONCE A DAY
Status: ACTIVE | COMMUNITY

## 2023-08-18 RX ORDER — LISINOPRIL 10 MG/1
AS DIRECTED TABLET ORAL
Refills: 0 | Status: ACTIVE | COMMUNITY
Start: 2022-04-22

## 2023-08-18 NOTE — HPI-TODAY'S VISIT:
Eriberto is a pleasant 68-year-old male who presents today for a follow-up.  History of JUANCARLOS and ileal ulcers, Gimenez's esophagus and personal history of colon polyps. S/P lap tiffany in May 2021.  Future procedures to be done in a hospital setting. 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  Labs dated 5/16/2023 showed creatinine 1.71, GFR 43.  Potassium 5.4.  Hemoglobin 13.1.  Normal platelets.  Iron studies normal.  Ferritin normal.  MRI elastography dated 8/10/2023 showed normal mean liver stiffness.  Patient underwent colonoscopy on 4/5/2023.  4 diminutive polyps.  Diminutive polyp removed from the mid ascending colon.  Diminutive polyp removed from the hepatic flexure.  Diminutive polyp removed from the mid descending colon in the area of previous tattoo site.  Diminutive polyp removed from the distal sigmoid colon.  Moderate left-sided diverticulosis coli.  Mild internal hemorrhoids present upon retroflexion.  Adequate prep.  All polyps consistent with tubular adenomas but no high-grade dysplasia seen.    EGD dated 1/19/2021 revealed esophageal mucosal changes classified as Gimenez's stage C3-M4 per [place] criteria.  Gastritis.  Normal third portion of the duodenum. Reactive gastropathy negative for H. pylori.  Lower third esophageal biopsies with evidence of intestinal metaplasia.  No dysplasia.      FibroScan dated 3/25/2022 showed S0 and F0   Ultrasound dated 2/18/2023 showed pancreas obscured by overlying bowel gas.  Otherwise right upper quadrant abdominal ultrasound within normal limits  Capsule endoscopy dated 5/26/2022 showed active bleeding in the proximal/mid small bowel  Patient underwent anterograde single balloon enteroscopy with Dr. Montano on 6/20/2022.  Unremarkable small bowel.  Findings of erosive gastropathy.  Received over 9 units of PRBC on his last admission.  Treated with IV iron.  Bleeding scan was positive.  Underwent attempted anterograde single balloon enteroscopy on 6/28/2022 but procedure was aborted because of malfunction of the enteroscope.  Superficial erosions seen in the examined proximal small bowel.  Patient treated with IV Protonix twice daily and subcutaneous octreotide.  Patient underwent successful cautery and hemostasis of actively bleeding mid jejunal vessel via surgically hand-assisted push enteroscopy with APC and Hemoclip placement.  Consideration of focal jejunal resection if recurrent bleed.  Patient is status post PCI and drug-eluting stents to the RCA and LAD in 2/2022.  Postop complicated by ileus.  Restarted on Plavix and aspirin by cardiology  CTA abdomen/pelvis dated 6/27/2022 showed no evidence of active arterial GI hemorrhage.  Descending and sigmoid colon diverticulosis.  Extensive mixed calcified and noncalcified atherosclerotic plaque of the infrarenal aorta.  There are either chronic short segment dissection flap within the common iliac arteries versus areas of mural thrombus and recanalization with weblike chronic plaque.  1 cm complex cyst versus mass at the midpole left kidney.  Follow-up renal protocol MRI is recommended for further evaluation   Notes 1-2 bowel movements daily without constipation or diarrhea. Currently taking Pantoprazole 40 mg qd. Minimal breakthrough symptoms relieved with Mylanta as needed. Denies nausea, vomiting, dysphagia, odynophagia, hematemesis, coffee ground emesis, abdominal pain, change in bowel habits, abnormal weight loss, BRBPR or melena. Denies any family history of colon cancer or colon polyps. Notes no other GI complaints at this time.

## 2023-08-18 NOTE — PHYSICAL EXAM GASTROINTESTINAL
Abdomen , soft, nontender, nondistended , no guarding or rigidity , no masses palpable , normal bowel sounds , Liver and Spleen,  no hepatosplenomegaly , liver nontender
Previously Declined (within the last year)

## 2023-08-25 ENCOUNTER — LAB OUTSIDE AN ENCOUNTER (OUTPATIENT)
Dept: URBAN - METROPOLITAN AREA CLINIC 63 | Facility: CLINIC | Age: 68
End: 2023-08-25

## 2023-12-28 ENCOUNTER — ERX REFILL RESPONSE (OUTPATIENT)
Dept: URBAN - METROPOLITAN AREA CLINIC 63 | Facility: CLINIC | Age: 68
End: 2023-12-28

## 2023-12-28 RX ORDER — PANTOPRAZOLE 40 MG/1
TAKE 1 TABLET DAILY 30 MINUTES BEFORE BREAKFAST TABLET, DELAYED RELEASE ORAL
Qty: 90 TABLET | Refills: 3 | OUTPATIENT

## 2023-12-28 RX ORDER — PANTOPRAZOLE SODIUM 40 MG/1
TAKE 1 TABLET DAILY 30 MINUTES  BEFORE BREAKFAST TABLET, DELAYED RELEASE ORAL
Qty: 90 TABLET | Refills: 3 | OUTPATIENT